# Patient Record
(demographics unavailable — no encounter records)

---

## 2025-01-22 NOTE — ADDENDUM
[FreeTextEntry1] : I, Dr. Mcgovern, personally performed the evaluation and management (E/M) services for this established patient who presents today with (a) new problem(s)/exacerbation of (an) existing condition(s). That E/M includes conducting the examination, assessing all new/exacerbated conditions, and establishing a new plan of care. Today, my NPP, Barby Nicolas, was here to observe my evaluation and management services for this new problem/exacerbated condition to be followed going forward

## 2025-01-22 NOTE — PHYSICAL EXAM
[Alert] : alert [Healthy Appearing] : healthy appearing [Sclera] : the sclera and conjunctiva were normal [Hearing Threshold Finger Rub Not [place]offe[place]] : hearing was normal [Normal Appearance] : the appearance of the neck was normal [No Respiratory Distress] : no respiratory distress [Auscultation Breath Sounds / Voice Sounds] : lungs were clear to auscultation bilaterally [Heart Rate And Rhythm] : heart rate was normal and rhythm regular [Bowel Sounds] : normal bowel sounds [Abdomen Tenderness] : non-tender [Abdomen Soft] : soft [Abnormal Walk] : normal gait [Normal Color / Pigmentation] : normal skin color and pigmentation [Oriented To Time, Place, And Person] : oriented to person, place, and time

## 2025-01-22 NOTE — ASSESSMENT
[FreeTextEntry1] : Plan: 55 yo F presenting for routine follow up for Crohn's disease. Doing well on Humira for maintenance, no changes recommended at this time. Discussed need for Hepatitis B booster given persistently not immune. Will send recommendation to PCP. Pt agreeable, all questions answered.

## 2025-01-22 NOTE — HISTORY OF PRESENT ILLNESS
[FreeTextEntry1] : Mackenzie Kirby is a 55 yo F presenting today for routine follow up for Crohn's disease. Is doing well on humira for maintenance currently. Typically has daily bowel movements without significant diarrhea or constipation, denies hematochezia or melena. Colonoscopy last year was WNL, due again in 3 years. Denies upper GI symptoms such as nausea, vomiting, GERD, dysphagia, denies unintentional weight loss. Most recent serologies again noted not immune to Hepatitis .

## 2025-04-09 NOTE — ASSESSMENT
[FreeTextEntry1] : 56 yo female with history of Crohn's disease which appears to be in remission on current regimen of Humira.  Patient is concerned about starting a second biologic medication of Dupixent.  Plan to consult with her Dermatologist as to possible alternative medications for her eczema.  If this is not possible, will consider switching to Stelara.  Discussed at length with patient and mother.

## 2025-04-09 NOTE — PHYSICAL EXAM

## 2025-04-09 NOTE — HISTORY OF PRESENT ILLNESS
[FreeTextEntry1] : Ms. SOPHIA ANN is a 55 year old female with history of Crohn's disease status post surgery.  Patient has been feeling clinically well on a regimen of Humira.  She has not had any further diarrhea or abdominal discomfort.  Patient has had ongoing dermatologic issues and has seen multiple dermatologist for evaluation.  She is felt to have aggressive eczema and has tried multiple creams without improvement.  It was suggested that she be started on Dupixent which patient is very hesitant about.  The possibility of changing her biologic medication to Stelara discussed at length with patient and mother.